# Patient Record
Sex: MALE | URBAN - METROPOLITAN AREA
[De-identification: names, ages, dates, MRNs, and addresses within clinical notes are randomized per-mention and may not be internally consistent; named-entity substitution may affect disease eponyms.]

---

## 2018-10-01 ENCOUNTER — OFFICE VISIT (OUTPATIENT)
Dept: URBAN - METROPOLITAN AREA CLINIC 11 | Facility: CLINIC | Age: 63
End: 2018-10-01
Payer: COMMERCIAL

## 2018-10-01 DIAGNOSIS — H34.8312 TRIBUTARY (BRANCH) RETINAL VEIN OCCLUSION, RIGHT EYE, STABLE: ICD-10-CM

## 2018-10-01 DIAGNOSIS — E11.3392 TYPE 2 DIAB W MODERATE NONPRLF DIAB RTNOP W/O MACULAR EDEMA, LEFT EYE: ICD-10-CM

## 2018-10-01 DIAGNOSIS — E11.3491 TYPE 2 DIAB W SEVERE NONPRLF DIAB RTNOP W/O MACULAR EDEMA, RIGHT EYE: Primary | ICD-10-CM

## 2018-10-01 PROCEDURE — 99213 OFFICE O/P EST LOW 20 MIN: CPT | Performed by: OPHTHALMOLOGY

## 2018-10-01 PROCEDURE — 92134 CPTRZ OPH DX IMG PST SGM RTA: CPT | Performed by: OPHTHALMOLOGY

## 2018-10-01 ASSESSMENT — VISUAL ACUITY
OD: 20/25
OS: 20/30

## 2018-10-01 ASSESSMENT — INTRAOCULAR PRESSURE
OS: 16
OD: 16

## 2018-10-01 NOTE — IMPRESSION/PLAN
Impression: Tributary (branch) retinal vein occlusion, right eye, stable: L76.9988. OD. Condition: established, stable. Plan: OCT ordered and performed today. Discussed diagnosis with patient. The clinical exam and OCT testing are consistent with Branch retinal artery occlusion.

## 2018-10-01 NOTE — IMPRESSION/PLAN
Impression: Type 2 diab w severe nonprlf diab rtnop w/o macular edema, right eye: [id number]. OD. Condition: established, stable. Plan: OCT ordered and performed today. The clinical exam is consistent with non proliferative diabetic retinopathy. Discussed diagnosis with patient. Recommend close observation at this time. Discussed risk of progression with the present condition. The patient was advised to maintain tight blood sugar control, blood pressure and lipid control. Patient agrees with plan.

## 2018-10-01 NOTE — IMPRESSION/PLAN
Impression: Type 2 diab w moderate nonprlf diab rtnop w/o macular edema, left eye: C24.1427. OS.  Plan: see notes above